# Patient Record
Sex: MALE | Race: AMERICAN INDIAN OR ALASKA NATIVE
[De-identification: names, ages, dates, MRNs, and addresses within clinical notes are randomized per-mention and may not be internally consistent; named-entity substitution may affect disease eponyms.]

---

## 2018-10-17 ENCOUNTER — HOSPITAL ENCOUNTER (EMERGENCY)
Dept: HOSPITAL 42 - ED | Age: 18
Discharge: HOME | End: 2018-10-17
Payer: MEDICAID

## 2018-10-17 VITALS
SYSTOLIC BLOOD PRESSURE: 126 MMHG | DIASTOLIC BLOOD PRESSURE: 80 MMHG | OXYGEN SATURATION: 99 % | TEMPERATURE: 98.7 F | RESPIRATION RATE: 18 BRPM | HEART RATE: 78 BPM

## 2018-10-17 VITALS — BODY MASS INDEX: 28 KG/M2
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
## 2018-10-17 DIAGNOSIS — S01.511A: Primary | ICD-10-CM

## 2018-10-17 NOTE — ED PDOC
Arrival/HPI





- General


Chief Complaint: Abnormal Skin Integrity


Time Seen by Provider: 10/17/18 21:18


Historian: Patient





- History of Present Illness


Narrative History of Present Illness (Text): 





10/17/18 21:21


17 year old male, with no significant past medical history, presents to the 

emergency department complaining of laceration to the left inner lip s/p being 

hit with a baseball 25 minutes prior to arrival. Patient reports the baseball 

hit his mouth directly chipping his tooth which cut his lip. Patient reports 

minimal pain, but denies any chest pain, shortness of breath, nausea, vomiting, 

back pain, neck pain, headache, dizziness, or any other complaints.   


Time/Duration: Other (25 minutes)


Symptom Onset: Sudden


Activities at Onset: Light


Context: Other (baseball)





Past Medical History





- Provider Review


Nursing Documentation Reviewed: Yes





- Reproductive


Currently Lactating: No





- Psychiatric


Hx Depression: No


Hx Emotional Abuse: No


Hx Physical Abuse: No


Hx Substance Use: Yes





- Anesthesia


Hx Anesthesia: No





- Suicidal Assessment


Feels Threatened In Home Enviroment: No





Family/Social History





- Physician Review


Nursing Documentation Reviewed: Yes


Family/Social History: No Known Family HX


Smoking Status: Current Some Days Smoker


Hx Alcohol Use: No


Hx Substance Use: Yes


Substance used: weed


Hx Substance Use Treatment: No





Allergies/Home Meds


Allergies/Adverse Reactions: 


Allergies





No Known Allergies Allergy (Verified 10/17/18 21:15)


   











Review of Systems





- Physician Review


All systems were reviewed & negative as marked: Yes





- Review of Systems


Respiratory: absent: SOB


Cardiovascular: absent: Chest Pain


Gastrointestinal: absent: Diarrhea, Nausea, Vomiting


Musculoskeletal: absent: Back Pain, Neck Pain


Skin: Laceration (to left inner lip)


Neurological: absent: Headache, Dizziness





Physical Exam





- Physical Exam


Narrative Physical Exam (Text): 





Gen: VS reviewed, alert, well developed, well nourished, nontoxic, mild 

distress.


ENT: normal pharynx.


Eye: EOMI, PERRL.


Neck: no JVD, supple, no adenopathy.


CV: regular rate, regular rhythm, no rubs, no murmur, no gallops, S1, S2, pulses

equal and strong.


Pulm: no distress, clear to auscultation, no wheeze, no rhonchi, breath sounds 

equal, no rales.


Abd: soft, nontender, no guarding, no rebound, no rigidity, normal bowel sounds.


Ext:  no edema.


Skin: less than 1cm laceration to the left corner of the mouth involving the 

mucosal side. Good strength of muscles. good color, no rash, no cyanosis.


Psych: responds appropriately to questions, normal affect.


Neuro: oriented x 3, CN2-12 intact grossly, motor intact, sensation intact.








Vital Signs Reviewed: Yes





Medical Decision Making


ED Course and Treatment: 





10/17/18 21:21


Impression:


17 year old male presents complaining of a laceration to the left inner lip s/p 

being hit with a baseball. 





Plan:


-- Lidocaine 


-- Laceration repair


-- Reassess and disposition








Progress Notes:








10/17/18 21:37


PROCEDURE: LACERATION REPAIR





Performed by the emergency provider


Location: ***


Length: *** cm


Description: {"clean wound edges","no foreign bodies"}


Distal CMS: Normal. No deficits. Neurovascularly intact.


Anesthesia: Lidocaine 1%***


Preparation: The wound was cleaned with NS and Betadyne. The area was prepped 

and draped in the usual sterile fashion.


Exploration:  The wound was explored and ***no foreign bodies were found.


Procedure: The wound was closed with *** nylon. There was {good / appropriate /

adequate / loose} approximation. In total, *** were used.


Post-Procedure: Good closure and hemostasis. The patient tolerated the 

procedure well and there were no complications. CSM remains intact. Post 

procedure dressing applied.


10/17/18 22:47


patient reports he developed a mild headache just prior to laceration repair. 

clinically benign headache, no LOC with head injury, no vomiting. symptoms 

consistent with concussion. 


10/17/18 22:52


patient was seen for low risk head injury. no LOC at injury onset, no headache 

at injury onset, no vomiting. laceration repaired did not have involvement of 

the vermillion border. patient was encouraged not to open his mouth wide as to 

not rip the sutures. tetanus updated. despite getting hit in the face with 

softball, there was no sig tendernss on exam and imaging not indicated.





Procedure: Wound Repair





- Time Performed


Time Performed: 22:45





- Time Out


Time Out: Side verified, Sterile procedures obs.





- Consent Obtained


Consent obtained: Verbal





- Indications


Indication(s):: Laceration (left cheek and corner of mouth)





- Location


Shape:: Linear, Stellate


Dimensions Length cm: 1cm


Depth:: Muscle





- Anesthetic Technique


Local/Regional Anesthetic:: Lidocaine 1%





- Debris


Debris:: None





- Irrigated


Irrigated with ml of normal saline: 5cc 1% lido without epi





- Complexity


Complexity:: Complex (3 layer)





- Wound repair method


Sutures:: # (40 vicryl, 7 sutures), Size





- Muscle repiar layer closed with


Muscle repair layer closed with:: # (4-0 vicryl, 2 sutures with good 

approximation of muscle layer)





- Complications


Complications: none





- Patient tolerated procedure


Patient Tolerated Procedure:: Well





- Scribe Statement


The provider has reviewed the documentation as recorded by the Maggie Cook





Provider Scribe Attestation:


All medical record entries made by the Scribe were at my direction and 

personally dictated by me. I have reviewed the chart and agree that the record 

accurately reflects my personal performance of the history, physical exam, 

medical decision making, and the department course for this patient. I have also

personally directed, reviewed, and agree with the discharge instructions and 

disposition.








Disposition/Present on Arrival





- Present on Arrival


Any Indicators Present on Arrival: No


History of DVT/PE: No


History of Uncontrolled Diabetes: No


Urinary Catheter: No


History of Decub. Ulcer: No


History Surgical Site Infection Following: None





- Disposition


Have Diagnosis and Disposition been Completed?: Yes


Diagnosis: 


 Laceration of lip, Head injury





Disposition: HOME/ ROUTINE


Disposition Time: 22:50


Patient Plan: Discharge


Patient Problems: 


                             Current Active Problems











Problem Status Onset


 


Head injury Acute 


 


Laceration of lip Acute 











Condition: STABLE


Discharge Instructions (ExitCare):  Laceration Repair With Stitches (DC), Minor 

Head Injury


Additional Instructions: 


The sutures that were placed are absorbable. You do not need to have the 

removed. They will dissolve over time.








SELENA CARDOZO, thank you for letting us take care of you today. Your provider was 

Dr.Lamont Maguire and you were treated for lip laceration. The emergency 

medical care you received today was directed at your acute symptoms. If you were

prescribed any medication, please fill it and take as directed. It may take 

several days for your symptoms to resolve. Return to the Emergency Department if

your symptoms worsen, do not improve, or if you have any other problems.





Please contact your doctor or call one of the physicians/clinics you have been 

referred to that are listed on the Patient Visit Information form that is 

included in your discharge packet. Bring any paperwork you were given at 

discharge with you along with any medications you are taking to your follow up 

visit. Our treatment cannot replace ongoing medical care by a primary care 

provider outside of the emergency department.





Thank you for allowing the [facility] team to be part of your care today.








If you had an X-Ray or CT scan: A Radiologist will review the ED reading if any 

change in treatment is needed we will contact you.***





If you had a blood, urine, or wound culture: It will take several days for the 

results, if any change in treatment is needed we will contact you.***





If you had an STI test: It will take 48 hours for the results. Please call after

1 week if you have not heard back.***


Prescriptions: 


Ibuprofen [Motrin Tab] 600 mg PO QID #42 tab


Referrals: 


Eva Barrett MD [Primary Care Provider] - Follow up with primary


Forms:  [employer] (English)